# Patient Record
Sex: FEMALE | URBAN - METROPOLITAN AREA
[De-identification: names, ages, dates, MRNs, and addresses within clinical notes are randomized per-mention and may not be internally consistent; named-entity substitution may affect disease eponyms.]

---

## 2023-12-19 ENCOUNTER — OFFICE VISIT (OUTPATIENT)
Dept: URBAN - METROPOLITAN AREA CLINIC 92 | Facility: CLINIC | Age: 58
End: 2023-12-19
Payer: COMMERCIAL

## 2023-12-19 ENCOUNTER — LAB OUTSIDE AN ENCOUNTER (OUTPATIENT)
Dept: URBAN - METROPOLITAN AREA CLINIC 92 | Facility: CLINIC | Age: 58
End: 2023-12-19

## 2023-12-19 VITALS
WEIGHT: 143 LBS | TEMPERATURE: 97.2 F | BODY MASS INDEX: 25.34 KG/M2 | HEART RATE: 76 BPM | HEIGHT: 63 IN | SYSTOLIC BLOOD PRESSURE: 129 MMHG | DIASTOLIC BLOOD PRESSURE: 74 MMHG

## 2023-12-19 DIAGNOSIS — Z83.719 FAMILY HISTORY OF COLONIC POLYPS: ICD-10-CM

## 2023-12-19 DIAGNOSIS — Z86.010 PERSONAL HISTORY OF COLONIC POLYPS: ICD-10-CM

## 2023-12-19 DIAGNOSIS — L71.9 ROSACEA: ICD-10-CM

## 2023-12-19 DIAGNOSIS — R05.3 CHRONIC COUGH: ICD-10-CM

## 2023-12-19 PROBLEM — 398909004: Status: ACTIVE | Noted: 2023-12-19

## 2023-12-19 PROBLEM — 428283002: Status: ACTIVE | Noted: 2023-12-19

## 2023-12-19 PROCEDURE — 99204 OFFICE O/P NEW MOD 45 MIN: CPT | Performed by: INTERNAL MEDICINE

## 2023-12-19 RX ORDER — SODIUM, POTASSIUM,MAG SULFATES 17.5-3.13G
AS DIRECTED SOLUTION, RECONSTITUTED, ORAL ORAL AS DIRECTED
Qty: 1 | Refills: 0 | OUTPATIENT
Start: 2023-12-19 | End: 2023-12-20

## 2023-12-19 RX ORDER — FEXOFENADINE HYDROCHLORIDE 60 MG/1
1 TABLET TABLET, FILM COATED ORAL TWICE A DAY
Status: ACTIVE | COMMUNITY

## 2023-12-19 RX ORDER — DOXYCYCLINE HYCLATE 100 MG/1
TAKE 1 CAPSULE BY MOUTH EVERY DAY WITH FOOD AND WATER CAPSULE ORAL
Qty: 30 EACH | Refills: 1 | COMMUNITY

## 2023-12-19 RX ORDER — MELOXICAM 15 MG/1
TABLET ORAL
Qty: 30 TABLET | COMMUNITY

## 2023-12-19 RX ORDER — ONDANSETRON HYDROCHLORIDE 4 MG/1
1 TABLET TABLET, FILM COATED ORAL
Qty: 2 TABLETS | Refills: 0 | OUTPATIENT
Start: 2023-12-19

## 2023-12-19 RX ORDER — DOXYCYCLINE HYCLATE 100 MG/1
CAPSULE ORAL
Qty: 30 CAPSULE | Status: ON HOLD | COMMUNITY

## 2023-12-19 RX ORDER — LEVOTHYROXINE SODIUM 75 UG/1
1 TABLET IN THE MORNING ON AN EMPTY STOMACH TABLET ORAL ONCE A DAY
Qty: 30 | Status: ACTIVE | COMMUNITY
Start: 2023-12-19

## 2023-12-19 RX ORDER — OMEPRAZOLE 40 MG/1
1 CAPSULE CAPSULE, DELAYED RELEASE ORAL ONCE A DAY
Qty: 90 | Refills: 1 | OUTPATIENT
Start: 2023-12-19

## 2023-12-19 NOTE — HPI-TODAY'S VISIT:
This is a 58-year-old female referred Dr Roa for GI consultation for colon cancer screening and a copy of this note will be sent to the referring provider.  Patient had a direct scheduled colonoscopy with my partner Dr. Jj done on December 12, 2017 for family history of colon polyps and this revealed a diminutive cecal polyp that was removed and a 6 mm cecal polyp that was also removed.  Pathology showed tubular adenoma and sessile serrated polyp.  Dr. Jj recommended repeat exam in 5 years or December 2022. Pt moves her bowels twice a day. Pt says lately BMs are hard or even soft not sure why. Maybe softer in the am. Pt says her thyroid med was changed recenlty Pt takes a facial cream for rosacea and trying abx for this. Pt has had a chronic cough since 2019- had neg pulm work up.  Pt has not tried PPIs empirically. Pt has seen allergist and has no allergies. Pt has constant post nasal drip. Pt denies heartburn or dysphagia.

## 2023-12-21 ENCOUNTER — TELEPHONE ENCOUNTER (OUTPATIENT)
Dept: URBAN - METROPOLITAN AREA CLINIC 92 | Facility: CLINIC | Age: 58
End: 2023-12-21

## 2023-12-22 ENCOUNTER — CLAIMS CREATED FROM THE CLAIM WINDOW (OUTPATIENT)
Dept: URBAN - METROPOLITAN AREA SURGERY CENTER 16 | Facility: SURGERY CENTER | Age: 58
End: 2023-12-22
Payer: COMMERCIAL

## 2023-12-22 ENCOUNTER — OFFICE VISIT (OUTPATIENT)
Dept: URBAN - METROPOLITAN AREA SURGERY CENTER 16 | Facility: SURGERY CENTER | Age: 58
End: 2023-12-22

## 2023-12-22 ENCOUNTER — CLAIMS CREATED FROM THE CLAIM WINDOW (OUTPATIENT)
Dept: URBAN - METROPOLITAN AREA CLINIC 4 | Facility: CLINIC | Age: 58
End: 2023-12-22
Payer: COMMERCIAL

## 2023-12-22 DIAGNOSIS — D12.0 BENIGN NEOPLASM OF CECUM: ICD-10-CM

## 2023-12-22 DIAGNOSIS — D12.2 ADENOMA OF ASCENDING COLON: ICD-10-CM

## 2023-12-22 DIAGNOSIS — Z86.010 ADENOMAS PERSONAL HISTORY OF COLONIC POLYPS: ICD-10-CM

## 2023-12-22 DIAGNOSIS — D12.0 ADENOMA OF CECUM: ICD-10-CM

## 2023-12-22 DIAGNOSIS — K64.8 EXTERNAL HEMORRHOIDS: ICD-10-CM

## 2023-12-22 DIAGNOSIS — K57.30 ACQUIRED DIVERTICULOSIS OF COLON: ICD-10-CM

## 2023-12-22 PROCEDURE — 45385 COLONOSCOPY W/LESION REMOVAL: CPT | Performed by: INTERNAL MEDICINE

## 2023-12-22 PROCEDURE — 00811 ANES LWR INTST NDSC NOS: CPT

## 2023-12-22 PROCEDURE — 88305 TISSUE EXAM BY PATHOLOGIST: CPT | Performed by: PATHOLOGY

## 2023-12-22 PROCEDURE — 00811 ANES LWR INTST NDSC NOS: CPT | Performed by: ANESTHESIOLOGY

## 2023-12-22 PROCEDURE — G8907 PT DOC NO EVENTS ON DISCHARG: HCPCS | Performed by: INTERNAL MEDICINE

## 2023-12-22 RX ORDER — DOXYCYCLINE HYCLATE 100 MG/1
CAPSULE ORAL
Qty: 30 CAPSULE | Status: ON HOLD | COMMUNITY

## 2023-12-22 RX ORDER — MELOXICAM 15 MG/1
TABLET ORAL
Qty: 30 TABLET | COMMUNITY

## 2023-12-22 RX ORDER — OMEPRAZOLE 40 MG/1
1 CAPSULE CAPSULE, DELAYED RELEASE ORAL ONCE A DAY
Qty: 90 | Refills: 1 | Status: ACTIVE | COMMUNITY
Start: 2023-12-19

## 2023-12-22 RX ORDER — DOXYCYCLINE HYCLATE 100 MG/1
TAKE 1 CAPSULE BY MOUTH EVERY DAY WITH FOOD AND WATER CAPSULE ORAL
Qty: 30 EACH | Refills: 1 | COMMUNITY

## 2023-12-22 RX ORDER — LEVOTHYROXINE SODIUM 75 UG/1
1 TABLET IN THE MORNING ON AN EMPTY STOMACH TABLET ORAL ONCE A DAY
Qty: 30 | Status: ACTIVE | COMMUNITY
Start: 2023-12-19

## 2023-12-22 RX ORDER — FEXOFENADINE HYDROCHLORIDE 60 MG/1
1 TABLET TABLET, FILM COATED ORAL TWICE A DAY
Status: ACTIVE | COMMUNITY

## 2023-12-22 RX ORDER — ONDANSETRON HYDROCHLORIDE 4 MG/1
1 TABLET TABLET, FILM COATED ORAL
Qty: 2 TABLETS | Refills: 0 | Status: ACTIVE | COMMUNITY
Start: 2023-12-19

## 2024-03-11 ENCOUNTER — TELEP (OUTPATIENT)
Dept: URBAN - METROPOLITAN AREA TELEHEALTH 2 | Facility: TELEHEALTH | Age: 59
End: 2024-03-11

## 2024-03-21 ENCOUNTER — LAB (OUTPATIENT)
Dept: URBAN - METROPOLITAN AREA TELEHEALTH 2 | Facility: TELEHEALTH | Age: 59
End: 2024-03-21

## 2024-03-22 ENCOUNTER — TELEP (OUTPATIENT)
Dept: URBAN - METROPOLITAN AREA TELEHEALTH 2 | Facility: TELEHEALTH | Age: 59
End: 2024-03-22
Payer: COMMERCIAL

## 2024-03-22 ENCOUNTER — LAB (OUTPATIENT)
Dept: URBAN - METROPOLITAN AREA TELEHEALTH 2 | Facility: TELEHEALTH | Age: 59
End: 2024-03-22

## 2024-03-22 VITALS — HEIGHT: 63 IN | WEIGHT: 143 LBS | BODY MASS INDEX: 25.34 KG/M2

## 2024-03-22 DIAGNOSIS — L71.9 ROSACEA: ICD-10-CM

## 2024-03-22 DIAGNOSIS — Z83.719 FAMILY HISTORY OF COLONIC POLYPS: ICD-10-CM

## 2024-03-22 DIAGNOSIS — R05.3 CHRONIC COUGH: ICD-10-CM

## 2024-03-22 DIAGNOSIS — Z86.010 PERSONAL HISTORY OF COLONIC POLYPS: ICD-10-CM

## 2024-03-22 PROCEDURE — 99204 OFFICE O/P NEW MOD 45 MIN: CPT | Performed by: INTERNAL MEDICINE

## 2024-03-22 RX ORDER — ONDANSETRON HYDROCHLORIDE 4 MG/1
1 TABLET TABLET, FILM COATED ORAL
Qty: 2 TABLETS | Refills: 0 | Status: ON HOLD | COMMUNITY
Start: 2023-12-19

## 2024-03-22 RX ORDER — LEVOTHYROXINE SODIUM 75 UG/1
1 TABLET IN THE MORNING ON AN EMPTY STOMACH TABLET ORAL ONCE A DAY
Qty: 30 | Status: ACTIVE | COMMUNITY
Start: 2023-12-19

## 2024-03-22 RX ORDER — MELOXICAM 15 MG/1
TABLET ORAL
Qty: 30 TABLET | Status: ON HOLD | COMMUNITY

## 2024-03-22 RX ORDER — OMEPRAZOLE 40 MG/1
1 CAPSULE CAPSULE, DELAYED RELEASE ORAL ONCE A DAY
Qty: 90 | Refills: 1 | Status: ACTIVE | COMMUNITY
Start: 2023-12-19

## 2024-03-22 RX ORDER — DOXYCYCLINE HYCLATE 100 MG/1
CAPSULE ORAL
Qty: 30 CAPSULE | Status: ON HOLD | COMMUNITY

## 2024-03-22 NOTE — HPI-TODAY'S VISIT:
This is a 58-year-old female referred Dr Roa for GI fu and a copy of this note will be sent to the referring provider.  Patient had a direct scheduled colonoscopy with my partner Dr. Jj done on December 12, 2017 for family history of colon polyps and this revealed a diminutive cecal polyp that was removed and a 6 mm cecal polyp that was also removed.  Pathology showed tubular adenoma and sessile serrated polyp.  Dr. Jj recommended repeat exam in 5 years or December 2022. Pt moves her bowels twice a day. Pt says lately BMs are hard or even soft not sure why. Maybe softer in the am. Pt says her thyroid med was changed recenlty Pt takes a facial cream for rosacea and trying abx for this. Pt has had a chronic cough since 2019- had neg pulm work up.  Pt has not tried PPIs empirically. Pt has seen allergist and has no allergies. Pt has constant post nasal drip. Pt denies heartburn or dysphagia. Dr. Jj did her colonoscopy on December 22, 2023.  This was done due to history of polyps.  This revealed an 8 mm cecal polyp that was removed with a cold snare, 6 mm ascending colon polyp that was removed with a cold snare and multiple diverticula throughout the entire colon as well as small internal hemorrhoids.  Pathology showed sessile serrated adenomas.  He recommended repeat colonoscopy in 5 years or December 2028. Pt has been on omeprazole and still wakes up with junk in throat and has a cough. The omeprazole helps some but she is still coughing. Negative allergy and negative pulmonary work ups in the past. Pt gets trapped gas.  Pt has not had a prior egd.  Pt has no classic reflux symptoms like heartburn,

## 2024-03-22 NOTE — PHYSICAL EXAM GASTROINTESTINAL
June 21, 2018      Whitney Gee MD  [address] [place] [address]  [facility] [address]           [facility]  [address] [place] [id number]  Phone: [phone]  Fax: [phone]          Patient: Tess Duncan   MR Number: [id number]   YOB: 1957   Date of Visit: 6/21/2018       Dear Dr. Whitney Gee:    Thank you for referring Tses Duncan to me for evaluation. Attached you will find relevant portions of my assessment and plan of care.    If you have questions, please do not hesitate to call me. I look forward to following Tess Duncan along with you.    Sincerely,    Ching Rico, NP    Enclosure  CC:  No Recipients    If you would like to receive this communication electronically, please contact [email or web address] or [phone] to request more information on [employer] Link access.    For providers and/or their staff who would like to refer a patient to [facility], please contact us through our one-stop-shop provider referral line, [facility], at [phone].    If you feel you have received this communication in error or would no longer like to receive these types of communications, please e-mail [email or web address]          Self Exam: Abdomen soft, non-tender to palpatation, non-distended